# Patient Record
Sex: MALE | Race: WHITE | NOT HISPANIC OR LATINO | URBAN - NONMETROPOLITAN AREA
[De-identification: names, ages, dates, MRNs, and addresses within clinical notes are randomized per-mention and may not be internally consistent; named-entity substitution may affect disease eponyms.]

---

## 2018-08-29 PROBLEM — H35.3131: Noted: 2019-03-21

## 2018-08-29 PROBLEM — H52.4: Noted: 2019-03-21

## 2018-08-29 PROBLEM — H40.1131: Noted: 2019-03-21

## 2018-08-29 PROBLEM — Z96.1: Noted: 2018-08-29

## 2019-03-21 ENCOUNTER — IMPORTED ENCOUNTER (OUTPATIENT)
Dept: URBAN - NONMETROPOLITAN AREA CLINIC 1 | Facility: CLINIC | Age: 74
End: 2019-03-21

## 2019-03-21 PROCEDURE — 92083 EXTENDED VISUAL FIELD XM: CPT

## 2019-03-21 PROCEDURE — 92014 COMPRE OPH EXAM EST PT 1/>: CPT

## 2019-03-21 PROCEDURE — 92133 CPTRZD OPH DX IMG PST SGM ON: CPT

## 2019-03-21 NOTE — PATIENT DISCUSSION
POAG OU:-  Discussed diagnosis in detail w/ pt today. -  Optos done in the past stable Wenceslao [address] 73 appearance-  VF 24-2 done today . Reliable OU  OD shows borderline inferior nasal step with   mild advancment OS shows borderline superior nasal step defects but stable-  OCT done today good RNFL OD and inferior RNFL thinning noted OS;     both stable by guided progression analysis OU. -  PACH done prev stable from previous with thin corneas OU. -  Patient has disc asymmetry with CDs of 0.75 Od and 0.8 OS. -  IOP stable today @ 12 and 13 continue with Lumigan sample was given    today-  Gonio done last visit grade lV with light pigment OU -  Patient has family hx of glaucoma in his father. -  Will have patient continue Lumigan OU QHS at this time. Rx sent into the   pharmacy for 90-day supply today. -  Continue to monitor every 6 months or PRN. ARMD OU mild/dry:  -  Educated patient on findings today. -  Appears stable on dilated exam today. -  Recomended AG use daily he is to call or come in ASAP changes noted. -  Recommended eye vitamins daily. -  Continue to monitor closely. P/C IOL OU (LRI OD TORIC OS)-  Stable and doing well. No VA complaints today. -  Moderate PCO noted-  Recommend PCO Evaluation with Dr. Sharla Mosley; patient agrees with plan. Patient has noticed a decrease in his vision and would like a glasses Rx. Recommend having YAG OU then MR s/p. -  Will continue to monitor. Clinical Emmetropia OD/Mixed Astigmatism OS w/Presbyopia-  discussed findings w/patient-  MR done by [facility] new spectacle Rx @ previous visit per pt request-  monitor as scheduledDES OU:-  Educated patient on condition. -  Right plug missing OS in place.-  Probably due to the long-term glaucoma drop use explained to patient. -  Continue w/Refresh PF BID OU PRN-  will continue to monitor at regular f/u's PVD OU:-  Educated patient on diagnosis today. -  Appears stable on dilated exam today without change. -  Signs/symptoms of retinal detachment discussed. patient to call or come in ASAP if changes are noted. -  Continue to monitor. Ptosis OU:-  Educated patient on findings. -  No superior field of vision complaint noted by the patient at this time.  -  Will continue to monitor for now.; 's Notes:  7/11/12 (AOA OU)DFE 11/25/13OCT  3/21/19Optos  7/22/16VF  3/21/19Gonio  1/25/18PHP  5/27/14  - no longer requiredIVFA

## 2019-09-23 ENCOUNTER — IMPORTED ENCOUNTER (OUTPATIENT)
Dept: URBAN - NONMETROPOLITAN AREA CLINIC 1 | Facility: CLINIC | Age: 74
End: 2019-09-23

## 2019-09-23 PROCEDURE — 92014 COMPRE OPH EXAM EST PT 1/>: CPT

## 2019-09-23 PROCEDURE — 92250 FUNDUS PHOTOGRAPHY W/I&R: CPT

## 2019-09-23 NOTE — PATIENT DISCUSSION
POAG OU:-  Discussed diagnosis in detail w/ pt today. -  Optos done in the past stable ONH appearance-  VF 24-2 done in the past  .Reliable OU  OD shows borderline inferior nasal step       with  mild advancment OS shows borderline superior nasal step defects but    stable-  OCT done previously good RNFL OD and inferior RNFL thinning noted OS;     both stable by guided progression analysis OU. -  PACH done prev stable from previous with thin corneas OU. -  Patient has disc asymmetry with CDs of 0.75 Od and 0.8 OS. -  IOP stable today @ 12 OU continue with Lumigan-  Gonio done last visit grade lV with light pigment OU -  Patient has family hx of glaucoma in his father. -  Will have patient continue Lumigan OU QHS at this time. Rx sent into the     pharmacy for 90-day supply today. by Killian Alonso-  Continue to monitor every 6 months or PRN. ARMD OU mild/dry:  -  Educated patient on findings today. -  Appears stable on dilated exam today. -  Recomended AG use daily he is to call or come in ASAP changes noted. -  Recommended eye vitamins daily. -Patient denies and famiyl hx that he is aware of. -  Optos done today  shows no signs of progression OU. Stable OU. -  Continue to monitor closely. P/C IOL OU (LRI OD TORIC OS)-  Stable and doing well. No VA complaints today.   -Cninte to monitor Clinical Emmetropia OD/Mixed Astigmatism OS w/Presbyopia-  discussed findings w/patient-  MR done by Dr. Spring Schaeffer today new glasses given -  monitor as scheduledDES OU:-  Educated patient on condition. -  Right plug missing OS in place.-  Probably due to the long-term glaucoma drop use explained to patient. -  Continue w/Refresh PF BID OU PRN-  will continue to monitor at regular f/u's PVD OU:-  Educated patient on diagnosis today. -  Appears stable on dilated exam today without change. -  Signs/symptoms of retinal detachment discussed. patient to call or come in ASAP if changes are noted. -  Continue to monitor. Ptosis OU:-  Educated patient on findings. -  No superior field of vision complaint noted by the patient at this time.  -  Will continue to monitor for now.; Dr's Notes: MR 9/23/19 done by ME (AOA OU)DFE 11/25/13OCT  3/21/19Optos  9/23/19 VF  3/21/19Gonio  1/25/18PHP  5/27/14  - no longer requiredIVFA

## 2020-10-20 ENCOUNTER — IMPORTED ENCOUNTER (OUTPATIENT)
Dept: URBAN - NONMETROPOLITAN AREA CLINIC 1 | Facility: CLINIC | Age: 75
End: 2020-10-20

## 2020-10-20 PROBLEM — H52.4: Noted: 2020-10-20

## 2020-10-20 PROBLEM — H40.1131: Noted: 2020-10-20

## 2020-10-20 PROBLEM — H43.813: Noted: 2020-10-20

## 2020-10-20 PROBLEM — H53.2: Noted: 2020-10-20

## 2020-10-20 PROBLEM — H16.223: Noted: 2020-10-20

## 2020-10-20 PROBLEM — H35.3131: Noted: 2020-10-20

## 2020-10-20 PROBLEM — Z96.1: Noted: 2020-10-20

## 2020-10-20 PROCEDURE — 92133 CPTRZD OPH DX IMG PST SGM ON: CPT

## 2020-10-20 PROCEDURE — 92083 EXTENDED VISUAL FIELD XM: CPT

## 2020-10-20 PROCEDURE — 92015 DETERMINE REFRACTIVE STATE: CPT

## 2020-10-20 PROCEDURE — 99214 OFFICE O/P EST MOD 30 MIN: CPT

## 2020-10-20 NOTE — PATIENT DISCUSSION
POAG OU:- Discussed diagnosis in detail w/ pt today. - Patient has family hx of glaucoma in his father. - Carrie Morrison done previously stable [facility] appearance- VF 24-2 done today OD shows Good field with Non Specific Defects and OS shows Good field with Superior Nasal Aspect- OCT done today OD shows Nasal NFL thinning and OS shows borderline Inferior NFL thinning  - PACH done previously stable from previous with thin corneas OU. - Gonio done previously grade lV with light pigment OU - IOP 12 OU- Cup to Disc noted at OD . 75 and OS . 8- Continue Lumigan OU QHS  RX sent to pharmacy today - Continue to monitor ARMD OU mild/dry:  - Discussed diagnosis in detail w/ pt today. - Recomended Amsler Grid use daily he is to call or come in ASAP changes noted. - Recommended eye vitamins daily such as [place] [address] done previously shows no signs of progression OU. Stable OU. - Continue to monitor P/C IOL OU (LRI OD TORIC OS)- Discussed diagnosis in detail with patient- Patient is stable at this time - Continue to monitorPresbyopia- Discussed diagnosis in detail with patient- Tahmina Bourgeois will re-check MR when not dilated  - Continue to monitor KILO / [place] OU:- Discussed diagnosis in detail with patient- Discussed signs and symptoms of progression- Recommend patient drinking plenty of water and starting Omega 3’s - Recommend Refresh or Systane  throughout the day- Continue to monitorPVD OU:- Educated patient on diagnosis today. - Appears stable on dilated exam today without change. - Signs/symptoms of retinal detachment discussed. patient to call or come in ASAP if changes are noted. - Continue to monitor. Ptosis OU:- Educated patient on findings. - No superior field of vision complaint noted by the patient at this time. - Will continue to monitor for now.  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

## 2020-11-10 ENCOUNTER — IMPORTED ENCOUNTER (OUTPATIENT)
Dept: URBAN - NONMETROPOLITAN AREA CLINIC 1 | Facility: CLINIC | Age: 75
End: 2020-11-10

## 2020-11-10 PROCEDURE — 92015 DETERMINE REFRACTIVE STATE: CPT

## 2020-11-10 NOTE — PATIENT DISCUSSION
Refraction only - Discussed diagnosis in detail with patient - MR done today by Dr. Sukhwinder Barnett and new glasses RX given - Continue to monitor - RTC 6 months POAG with Optos -----------------------------previous notes-------------------------------POAG OU:- Discussed diagnosis in detail w/ pt today. - Patient has family hx of glaucoma in his father. - Eric Christianorolanda done previously stable Wenceslao [address] 73 appearance- VF 24-2 done today OD shows Good field with Non Specific Defects and OS shows Good field with Superior Nasal Aspect- OCT done today OD shows Nasal NFL thinning and OS shows borderline Inferior NFL thinning  - PACH done previously stable from previous with thin corneas OU. - Gonio done previously grade lV with light pigment OU - IOP 12 OU- Cup to Disc noted at OD . 75 and OS . 8- Continue Lumigan OU QHS  RX sent to pharmacy today - Continue to monitor ARMD OU mild/dry:  - Discussed diagnosis in detail w/ pt today. - Recomended Amsler Grid use daily he is to call or come in ASAP changes noted. - Recommended eye vitamins daily such as [place] [address] done previously shows no signs of progression OU. Stable OU. - Continue to monitor P/C IOL OU (LRI OD TORIC OS)- Discussed diagnosis in detail with patient- Patient is stable at this time - Continue to monitorPresbyopia- Discussed diagnosis in detail with patient- Chuck Reich will re-check MR when not dilated  - Continue to monitor KILO / [place] OU:- Discussed diagnosis in detail with patient- Discussed signs and symptoms of progression- Recommend patient drinking plenty of water and starting Omega 3’s - Recommend Refresh or Systane  throughout the day- Continue to monitorPVD OU:- Educated patient on diagnosis today. - Appears stable on dilated exam today without change. - Signs/symptoms of retinal detachment discussed. patient to call or come in ASAP if changes are noted. - Continue to monitor. Ptosis OU:- Educated patient on findings. - No superior field of vision complaint noted by the patient at this time. - Will continue to monitor for now.  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

## 2021-05-10 ENCOUNTER — IMPORTED ENCOUNTER (OUTPATIENT)
Dept: URBAN - NONMETROPOLITAN AREA CLINIC 1 | Facility: CLINIC | Age: 76
End: 2021-05-10

## 2021-05-10 PROCEDURE — 99214 OFFICE O/P EST MOD 30 MIN: CPT

## 2021-05-10 PROCEDURE — 92250 FUNDUS PHOTOGRAPHY W/I&R: CPT

## 2021-05-10 NOTE — PATIENT DISCUSSION
POAG OU:- Discussed diagnosis in detail w/ pt today. - Patient has family hx of glaucoma in his father. - Lambert Block done today stable ONH appearance- VF 24-2 done previously OD shows Good field with Non Specific Defects and OS shows Good field with Superior Nasal Aspect- OCT done previously OD shows Nasal NFL thinning and OS shows borderline Inferior NFL thinning  - PACH done previously stable from previous with thin corneas OU. - Gonio done previously grade lV with light pigment OU - IOP 12 OU- Cup to Disc noted at OD . 75 and OS . 8- Continue Lumigan OU QHS  RX sent to pharmacy today - Continue to monitor ARMD OU mild/dry:  - Discussed diagnosis in detail w/ pt today. - Recomended Amsler Grid use daily he is to call or come in ASAP changes noted. - Recommended eye vitamins daily such as [place] [address] done previously shows no signs of progression OU. Stable OU. - Continue to monitor P/C IOL OU (LRI OD TORIC OS)- Discussed diagnosis in detail with patient- Patient is stable at this time - Continue to monitorPresbyopia- Discussed diagnosis in detail with patient- Continue to monitor KILO / [place] OU:- Discussed diagnosis in detail with patient- Discussed signs and symptoms of progression- Recommend patient drinking plenty of water and starting Omega 3’s - Recommend Refresh or Systane  throughout the day- Continue to monitorPVD OU:- Educated patient on diagnosis today. - Appears stable on dilated exam today without change. - Signs/symptoms of retinal detachment discussed. patient to call or come in ASAP if changes are noted. - Continue to monitor. Ptosis OU:- Educated patient on findings. - No superior field of vision complaint noted by the patient at this time. - Will continue to monitor for now.  Dipolia- Discussed diagnosis in detail with patient - Patient states that for the past year has noticed ghosting lines / double vision - Patient to come back and will re-check his refraction when he is not dilated - Continue to senia Tidwell's Notes: MR 11/10/20DFE 10/20/20OCT 10/20/20Optos 5/10/21VF  10/20/20Gonio  1/25/18PHP  5/27/14  - no longer requiredIVFA

## 2021-11-05 ENCOUNTER — IMPORTED ENCOUNTER (OUTPATIENT)
Dept: URBAN - NONMETROPOLITAN AREA CLINIC 1 | Facility: CLINIC | Age: 76
End: 2021-11-05

## 2021-11-05 PROBLEM — Z96.1: Noted: 2021-11-05

## 2021-11-05 PROBLEM — H43.813: Noted: 2020-10-20

## 2021-11-05 PROBLEM — H40.1131: Noted: 2020-10-20

## 2021-11-05 PROBLEM — H16.223: Noted: 2021-11-05

## 2021-11-05 PROBLEM — H52.4: Noted: 2021-11-05

## 2021-11-05 PROBLEM — H35.3131: Noted: 2020-10-20

## 2021-11-05 PROBLEM — H53.2: Noted: 2020-10-20

## 2021-11-05 PROCEDURE — 92133 CPTRZD OPH DX IMG PST SGM ON: CPT

## 2021-11-05 PROCEDURE — 99214 OFFICE O/P EST MOD 30 MIN: CPT

## 2021-11-05 PROCEDURE — 92083 EXTENDED VISUAL FIELD XM: CPT

## 2021-11-05 NOTE — PATIENT DISCUSSION
POAG OU:- Discussed diagnosis in detail w/ pt today. - Patient has family hx of glaucoma in his father. - Renetta Brown done previously stable ONH appearance- VF 24-2 done today OD shows Good field with Non Specific Defects and OS shows Good field with Superior Nasal Step - OCT done today OD shows No NFL thinning and OS shows Inferior NFL thinning  - PACH done previously stable from previous with thin corneas OU. - Gonio done previously grade lV with light pigment OU - IOP 12 OU- Cup to Disc noted at OD . 75 and OS . 8- Continue Lumigan OU QHS  RX sent to pharmacy today - Continue to monitor ARMD OU mild/dry:  - Discussed diagnosis in detail w/ pt today. - Recomended Amsler Grid use daily he is to call or come in ASAP changes noted. - Recommended eye vitamins daily such as [place] [address] done previously shows no signs of progression OU. Stable OU. - Continue to monitor P/C IOL OU (LRI OD TORIC OS)- Discussed diagnosis in detail with patient- Patient is stable at this time - Continue to monitorPresbyopia- Discussed diagnosis in detail with patient- Continue to monitor KILO / [place] OU:- Discussed diagnosis in detail with patient- Discussed signs and symptoms of progression- Recommend patient drinking plenty of water and starting Omega 3’s - Recommend Refresh or Systane  throughout the day- Continue to monitorPVD OU:- Educated patient on diagnosis today. - Appears stable on dilated exam today without change. - Signs/symptoms of retinal detachment discussed. patient to call or come in ASAP if changes are noted. - Continue to monitor. Ptosis OU:- Educated patient on findings. - No superior field of vision complaint noted by the patient at this time. - Will continue to monitor for now.  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

## 2022-04-10 ASSESSMENT — VISUAL ACUITY
OD_CC: 20/25
OS_CC: 20/29
OS_PH: 20/20-
OD_SC: 20/22
OS_SC: 20/25-2
OD_SC: 20/30-1
OD_SC: 20/22
OS_SC: 20/22
OS_CC: 20/40-
OS_SC: 20/22
OD_SC: 20/25
OD_CC: 20/20-1
OS_SC: 20/30

## 2022-04-10 ASSESSMENT — PACHYMETRY
OS_CT_UM: 551; ADJ: THIN
OD_CT_UM: 551; ADJ: THIN
OS_CT_UM: 551; ADJ: THIN
OD_CT_UM: 551; ADJ: THIN
OS_CT_UM: 551; ADJ: THIN
OD_CT_UM: 551; ADJ: THIN
OS_CT_UM: 551; ADJ: THIN

## 2022-04-10 ASSESSMENT — TONOMETRY
OS_IOP_MMHG: 12
OD_IOP_MMHG: 12
OS_IOP_MMHG: 12
OD_IOP_MMHG: 12
OS_IOP_MMHG: 12
OS_IOP_MMHG: 12
OD_IOP_MMHG: 12
OS_IOP_MMHG: 13
OD_IOP_MMHG: 12
OD_IOP_MMHG: 12

## 2022-05-03 ENCOUNTER — FOLLOW UP (OUTPATIENT)
Dept: URBAN - NONMETROPOLITAN AREA CLINIC 1 | Facility: CLINIC | Age: 77
End: 2022-05-03

## 2022-05-03 DIAGNOSIS — H52.4: ICD-10-CM

## 2022-05-03 DIAGNOSIS — H40.1131: ICD-10-CM

## 2022-05-03 PROCEDURE — 92015 DETERMINE REFRACTIVE STATE: CPT

## 2022-05-03 PROCEDURE — 99214 OFFICE O/P EST MOD 30 MIN: CPT

## 2022-05-03 PROCEDURE — 92250 FUNDUS PHOTOGRAPHY W/I&R: CPT

## 2022-05-03 ASSESSMENT — VISUAL ACUITY
OS_CC: 20/32
OD_CC: 20/32

## 2022-05-03 ASSESSMENT — TONOMETRY
OS_IOP_MMHG: 12
OD_IOP_MMHG: 12

## 2022-05-03 NOTE — PATIENT DISCUSSION
"Patient has come in several times and complained about intermittent double vision. Attempted to check MR with prisim today, but unable to correct as I feel like it is directly related to his ARMD as it is typically monocular (not with both eyes open always). I feel like his vision is more of a distortion with a ""ghosting"" of letters/images. Explained prisim would likely not help.  Recommended using OTC readers for near only to give him a wider range of vision

## 2022-05-03 NOTE — PATIENT DISCUSSION
Patient to continue with current gtt regimen (Lumigan OU QHS). Patient advised to be compliant with gtts. Condition was discussed with patient and patient understands. Will continue to monitor patient for any progression in condition. Patient was advised to call us with any problems, questions, or concerns.

## 2022-05-03 NOTE — PATIENT DISCUSSION
Previous VF 24-2:  OD shows Good field with Non Specific Defects and OS shows Good field with Superior Nasal Step.

## 2022-05-03 NOTE — PATIENT DISCUSSION
KILO / [place] OU:  Discussed diagnosis in detail with patient. Discussed signs and symptoms of progression. Recommend patient drinking plenty of water and starting Omega 3’s. Recommend Refresh or Systane throughout the day. Continue to monitor.

## 2022-11-04 ENCOUNTER — FOLLOW UP (OUTPATIENT)
Dept: URBAN - NONMETROPOLITAN AREA CLINIC 1 | Facility: CLINIC | Age: 77
End: 2022-11-04

## 2022-11-04 DIAGNOSIS — H40.1131: ICD-10-CM

## 2022-11-04 DIAGNOSIS — H35.3131: ICD-10-CM

## 2022-11-04 PROCEDURE — 92133 CPTRZD OPH DX IMG PST SGM ON: CPT

## 2022-11-04 PROCEDURE — 92083 EXTENDED VISUAL FIELD XM: CPT

## 2022-11-04 PROCEDURE — 92134 CPTRZ OPH DX IMG PST SGM RTA: CPT

## 2022-11-04 PROCEDURE — 99214 OFFICE O/P EST MOD 30 MIN: CPT

## 2022-11-04 ASSESSMENT — VISUAL ACUITY
OS_PH: 20/20-1
OS_CC: 20/50
OD_CC: 20/20-1

## 2022-11-04 ASSESSMENT — TONOMETRY
OD_IOP_MMHG: 12
OS_IOP_MMHG: 12

## 2022-11-04 NOTE — PATIENT DISCUSSION
"Patient has come in several times and complained about intermittent double vision. Attempted to check MR with prisim today, but unable to correct as I feel like it is directly related to his ARMD as it is typically monocular (not with both eyes open always). I feel like his vision is more of a distortion with a ""ghosting"" of letters/images. Explained prisim would likely not help.  Recommended using OTC readers for near only to give him a wider range of vision
(Without Tear) No holes, tears or detachments. Patient cautioned to call our office immediately if they experience a substantial change in their symptoms such as an increase in floaters, persistent flashes, loss of visual field (may appear as a shadow or a curtain) or decrease in visual acuity as these may indicate a retinal tear or detachment.
Advised to call immediately if any worsening distortion or blurring is noted.
Continue with same eyeglasses.
Discussed findings w/ pt today. Signs/symptoms possibly associated discussed.
Importance of daily AREDS II study multivitamin and Amsler Grid checks discussed with patient. Patient to follow-up immediately with any new onset of decreased vision and/or metamorphopsia.
KILO / Rosaline Dies OU:  Discussed diagnosis in detail with patient. Discussed signs and symptoms of progression. Recommend patient drinking plenty of water and starting Omega 3’s. Recommend Refresh or Systane throughout the day. Continue to monitor.
OCT:  OD shows No NFL thinning and OS shows Inferior NFL thinning previously, stable today.
Patient to continue with current gtt regimen (Lumigan OU QHS). Patient advised to be compliant with gtts. Condition was discussed with patient and patient understands. Will continue to monitor patient for any progression in condition. Patient was advised to call us with any problems, questions, or concerns.
Patient understands condition, prognosis and need for follow up care.
Previous Gonio:  Grade lV with light pigment OU.
Previous PACH done previously stable from previous with thin corneas OU.
Stable. Observe.
VF 24-2:  OD shows Good field with Non Specific Defects and OS shows Good field with Superior Nasal Step, previously, stable today.
[facility]

## 2023-05-04 ENCOUNTER — FOLLOW UP (OUTPATIENT)
Dept: URBAN - NONMETROPOLITAN AREA CLINIC 1 | Facility: CLINIC | Age: 78
End: 2023-05-04

## 2023-05-04 DIAGNOSIS — H52.4: ICD-10-CM

## 2023-05-04 DIAGNOSIS — H35.3131: ICD-10-CM

## 2023-05-04 DIAGNOSIS — H40.1131: ICD-10-CM

## 2023-05-04 PROCEDURE — 92015 DETERMINE REFRACTIVE STATE: CPT

## 2023-05-04 PROCEDURE — 99214 OFFICE O/P EST MOD 30 MIN: CPT

## 2023-05-04 PROCEDURE — 92250 FUNDUS PHOTOGRAPHY W/I&R: CPT

## 2023-05-04 ASSESSMENT — TONOMETRY
OS_IOP_MMHG: 12
OD_IOP_MMHG: 12

## 2023-05-04 ASSESSMENT — VISUAL ACUITY
OS_CC: 20/20
OU_CC: 20/20
OD_CC: 20/30

## 2023-08-25 ENCOUNTER — EMERGENCY VISIT (OUTPATIENT)
Dept: URBAN - NONMETROPOLITAN AREA CLINIC 1 | Facility: CLINIC | Age: 78
End: 2023-08-25

## 2023-08-25 DIAGNOSIS — H35.3131: ICD-10-CM

## 2023-08-25 DIAGNOSIS — H43.813: ICD-10-CM

## 2023-08-25 DIAGNOSIS — G43.B1: ICD-10-CM

## 2023-08-25 PROCEDURE — 92250 FUNDUS PHOTOGRAPHY W/I&R: CPT

## 2023-08-25 PROCEDURE — 92134 CPTRZ OPH DX IMG PST SGM RTA: CPT

## 2023-08-25 PROCEDURE — 99213 OFFICE O/P EST LOW 20 MIN: CPT

## 2023-08-25 ASSESSMENT — VISUAL ACUITY
OS_PH: 20/30-1
OS_CC: 20/40
OD_CC: 20/30-1
OU_CC: 20/25

## 2023-08-25 ASSESSMENT — TONOMETRY
OS_IOP_MMHG: 12
OD_IOP_MMHG: 12

## 2023-11-10 ENCOUNTER — FOLLOW UP (OUTPATIENT)
Dept: URBAN - NONMETROPOLITAN AREA CLINIC 1 | Facility: CLINIC | Age: 78
End: 2023-11-10

## 2023-11-10 DIAGNOSIS — H35.3131: ICD-10-CM

## 2023-11-10 DIAGNOSIS — H40.1131: ICD-10-CM

## 2023-11-10 PROCEDURE — 99214 OFFICE O/P EST MOD 30 MIN: CPT

## 2023-11-10 PROCEDURE — 92134 CPTRZ OPH DX IMG PST SGM RTA: CPT

## 2023-11-10 ASSESSMENT — TONOMETRY
OS_IOP_MMHG: 12
OD_IOP_MMHG: 12

## 2023-11-10 ASSESSMENT — VISUAL ACUITY
OS_CC: 20/25
OD_CC: 20/20

## 2024-05-14 ENCOUNTER — FOLLOW UP (OUTPATIENT)
Dept: URBAN - NONMETROPOLITAN AREA CLINIC 1 | Facility: CLINIC | Age: 79
End: 2024-05-14

## 2024-05-14 DIAGNOSIS — H40.1131: ICD-10-CM

## 2024-05-14 DIAGNOSIS — H52.4: ICD-10-CM

## 2024-05-14 PROCEDURE — 99213 OFFICE O/P EST LOW 20 MIN: CPT

## 2024-05-14 PROCEDURE — 92133 CPTRZD OPH DX IMG PST SGM ON: CPT

## 2024-05-14 PROCEDURE — 92015 DETERMINE REFRACTIVE STATE: CPT

## 2024-05-14 PROCEDURE — 92083 EXTENDED VISUAL FIELD XM: CPT

## 2024-05-14 ASSESSMENT — TONOMETRY
OS_IOP_MMHG: 12
OD_IOP_MMHG: 12

## 2024-05-14 ASSESSMENT — VISUAL ACUITY
OS_CC: 20/22
OD_CC: 20/20-1
OU_CC: 20/20

## 2024-11-12 ENCOUNTER — FOLLOW UP (OUTPATIENT)
Dept: URBAN - NONMETROPOLITAN AREA CLINIC 1 | Facility: CLINIC | Age: 79
End: 2024-11-12

## 2024-11-12 DIAGNOSIS — H35.3131: ICD-10-CM

## 2024-11-12 DIAGNOSIS — H16.223: ICD-10-CM

## 2024-11-12 DIAGNOSIS — H43.813: ICD-10-CM

## 2024-11-12 DIAGNOSIS — H40.1131: ICD-10-CM

## 2024-11-12 PROCEDURE — 99213 OFFICE O/P EST LOW 20 MIN: CPT

## 2024-11-12 PROCEDURE — 92134 CPTRZ OPH DX IMG PST SGM RTA: CPT

## 2025-05-15 ENCOUNTER — FOLLOW UP (OUTPATIENT)
Age: 80
End: 2025-05-15

## 2025-05-15 DIAGNOSIS — H40.1131: ICD-10-CM

## 2025-05-15 DIAGNOSIS — H35.3131: ICD-10-CM

## 2025-05-15 DIAGNOSIS — H43.813: ICD-10-CM

## 2025-05-15 DIAGNOSIS — H52.4: ICD-10-CM

## 2025-05-15 DIAGNOSIS — H16.223: ICD-10-CM

## 2025-05-15 PROCEDURE — 99213 OFFICE O/P EST LOW 20 MIN: CPT

## 2025-05-15 PROCEDURE — 92015 DETERMINE REFRACTIVE STATE: CPT

## 2025-05-15 PROCEDURE — 92250 FUNDUS PHOTOGRAPHY W/I&R: CPT
